# Patient Record
Sex: MALE | Race: WHITE | NOT HISPANIC OR LATINO | Employment: OTHER | URBAN - METROPOLITAN AREA
[De-identification: names, ages, dates, MRNs, and addresses within clinical notes are randomized per-mention and may not be internally consistent; named-entity substitution may affect disease eponyms.]

---

## 2017-01-26 DIAGNOSIS — K21.9 GASTROESOPHAGEAL REFLUX DISEASE, ESOPHAGITIS PRESENCE NOT SPECIFIED: ICD-10-CM

## 2017-01-26 NOTE — TELEPHONE ENCOUNTER
Last seen: 9/26/16 by Dr. Beltrán  Next appt: None    Was the patient seen in the last year in this department? Yes   Does patient have an active prescription for medications requested? No   Received Request Via: Pharmacy

## 2017-01-27 RX ORDER — FAMOTIDINE 20 MG/1
20 TABLET, FILM COATED ORAL 2 TIMES DAILY
Qty: 180 TAB | Refills: 1 | Status: SHIPPED | OUTPATIENT
Start: 2017-01-27 | End: 2017-03-15 | Stop reason: SDUPTHER

## 2017-02-20 DIAGNOSIS — E11.9 TYPE 2 DIABETES MELLITUS WITHOUT COMPLICATION, WITHOUT LONG-TERM CURRENT USE OF INSULIN (HCC): ICD-10-CM

## 2017-03-15 ENCOUNTER — OFFICE VISIT (OUTPATIENT)
Dept: INTERNAL MEDICINE | Facility: MEDICAL CENTER | Age: 82
End: 2017-03-15
Payer: MEDICARE

## 2017-03-15 VITALS
WEIGHT: 174 LBS | DIASTOLIC BLOOD PRESSURE: 64 MMHG | OXYGEN SATURATION: 96 % | HEIGHT: 70 IN | SYSTOLIC BLOOD PRESSURE: 120 MMHG | TEMPERATURE: 98.2 F | HEART RATE: 84 BPM | BODY MASS INDEX: 24.91 KG/M2

## 2017-03-15 DIAGNOSIS — K59.00 CONSTIPATION, UNSPECIFIED CONSTIPATION TYPE: ICD-10-CM

## 2017-03-15 DIAGNOSIS — K21.9 GASTROESOPHAGEAL REFLUX DISEASE, ESOPHAGITIS PRESENCE NOT SPECIFIED: ICD-10-CM

## 2017-03-15 DIAGNOSIS — J20.8 VIRAL BRONCHITIS: ICD-10-CM

## 2017-03-15 DIAGNOSIS — E11.9 TYPE 2 DIABETES MELLITUS WITHOUT COMPLICATION, WITHOUT LONG-TERM CURRENT USE OF INSULIN (HCC): ICD-10-CM

## 2017-03-15 DIAGNOSIS — I10 ESSENTIAL HYPERTENSION: ICD-10-CM

## 2017-03-15 DIAGNOSIS — E78.5 DYSLIPIDEMIA: ICD-10-CM

## 2017-03-15 DIAGNOSIS — I25.10 CORONARY ARTERY DISEASE INVOLVING NATIVE CORONARY ARTERY OF NATIVE HEART WITHOUT ANGINA PECTORIS: ICD-10-CM

## 2017-03-15 PROCEDURE — G8598 ASA/ANTIPLAT THER USED: HCPCS | Mod: GC | Performed by: INTERNAL MEDICINE

## 2017-03-15 PROCEDURE — G8420 CALC BMI NORM PARAMETERS: HCPCS | Mod: GC | Performed by: INTERNAL MEDICINE

## 2017-03-15 PROCEDURE — 4040F PNEUMOC VAC/ADMIN/RCVD: CPT | Mod: GC | Performed by: INTERNAL MEDICINE

## 2017-03-15 PROCEDURE — G8482 FLU IMMUNIZE ORDER/ADMIN: HCPCS | Mod: GC | Performed by: INTERNAL MEDICINE

## 2017-03-15 PROCEDURE — G8432 DEP SCR NOT DOC, RNG: HCPCS | Mod: GC | Performed by: INTERNAL MEDICINE

## 2017-03-15 PROCEDURE — 99213 OFFICE O/P EST LOW 20 MIN: CPT | Mod: GE | Performed by: INTERNAL MEDICINE

## 2017-03-15 PROCEDURE — 1101F PT FALLS ASSESS-DOCD LE1/YR: CPT | Mod: GC | Performed by: INTERNAL MEDICINE

## 2017-03-15 PROCEDURE — 1036F TOBACCO NON-USER: CPT | Mod: GC | Performed by: INTERNAL MEDICINE

## 2017-03-15 RX ORDER — FLUTICASONE PROPIONATE 50 MCG
1 SPRAY, SUSPENSION (ML) NASAL DAILY
Qty: 16 G | Refills: 0 | Status: SHIPPED | OUTPATIENT
Start: 2017-03-15

## 2017-03-15 RX ORDER — AMLODIPINE BESYLATE 10 MG/1
10 TABLET ORAL DAILY
Qty: 30 TAB | Refills: 5 | Status: SHIPPED | OUTPATIENT
Start: 2017-03-15

## 2017-03-15 RX ORDER — CODEINE PHOSPHATE/GUAIFENESIN 10-100MG/5
5 LIQUID (ML) ORAL 3 TIMES DAILY PRN
Qty: 120 ML | Refills: 0 | Status: SHIPPED | OUTPATIENT
Start: 2017-03-15

## 2017-03-15 RX ORDER — CLOPIDOGREL BISULFATE 75 MG/1
75 TABLET ORAL DAILY
Qty: 30 TAB | Refills: 5 | Status: SHIPPED | OUTPATIENT
Start: 2017-03-15

## 2017-03-15 RX ORDER — ATORVASTATIN CALCIUM 20 MG/1
20 TABLET, FILM COATED ORAL
Qty: 30 TAB | Refills: 5 | Status: SHIPPED | OUTPATIENT
Start: 2017-03-15

## 2017-03-15 RX ORDER — FAMOTIDINE 20 MG/1
20 TABLET, FILM COATED ORAL 2 TIMES DAILY
Qty: 60 TAB | Refills: 5 | Status: SHIPPED | OUTPATIENT
Start: 2017-03-15

## 2017-03-15 RX ORDER — METOPROLOL SUCCINATE 25 MG/1
25 TABLET, EXTENDED RELEASE ORAL DAILY
Qty: 30 TAB | Refills: 5 | Status: SHIPPED | OUTPATIENT
Start: 2017-03-15

## 2017-03-15 RX ORDER — EZETIMIBE 10 MG
10 TABLET ORAL DAILY
Qty: 30 TAB | Refills: 5 | Status: SHIPPED | OUTPATIENT
Start: 2017-03-15

## 2017-03-15 RX ORDER — BENAZEPRIL HYDROCHLORIDE 40 MG/1
40 TABLET ORAL DAILY
Qty: 30 TAB | Refills: 5 | Status: SHIPPED | OUTPATIENT
Start: 2017-03-15

## 2017-03-15 RX ORDER — SENNOSIDES 8.6 MG
1 TABLET ORAL
Qty: 30 TAB | Refills: 5 | Status: SHIPPED | OUTPATIENT
Start: 2017-03-15

## 2017-03-15 RX ORDER — HYDROCHLOROTHIAZIDE 12.5 MG/1
12.5 CAPSULE, GELATIN COATED ORAL DAILY
Qty: 30 CAP | Refills: 5 | Status: SHIPPED | OUTPATIENT
Start: 2017-03-15

## 2017-03-15 NOTE — MR AVS SNAPSHOT
"        Chantelle Fergusonalejandrina   3/15/2017 11:00 AM   Office Visit   MRN: [id number]    Department:  [facility]   Dept Phone:  [phone]    Description:  Male : 11/3/1932   Provider:  Kb Vazquez M.D.           Reason for Visit     Follow-Up follow up    Medication Problem cough medicine, see if he can get some      Allergies as of 3/15/2017     Not on File      You were diagnosed with     Viral bronchitis   [762068]       Type 2 diabetes mellitus without complication, without long-term current use of insulin (CMS-HCC)   [2182474]       Gastroesophageal reflux disease, esophagitis presence not specified   [2212406]       Dyslipidemia   [985082]       Essential hypertension   [3906823]       Constipation, unspecified constipation type   [2801952]       Coronary artery disease involving native coronary artery of native heart without angina pectoris   [5840373]         Vital Signs     Blood Pressure Pulse Temperature Height Weight Body Mass Index    120/64 mmHg 84 36.8 °C (98.2 °F) 1.778 m (5' 10\") 78.926 kg (174 lb) 24.97 kg/m2    Oxygen Saturation Smoking Status                96% Former Smoker          Basic Information     Date Of Birth Sex Race Ethnicity Preferred Language    11/3/1932 Male White Non- English      Problem List              ICD-10-CM Priority Class Noted - Resolved    Type 2 diabetes mellitus without complication (CMS-HCC) E11.9   2016 - Present    Essential hypertension I10   2016 - Present    Dyslipidemia E78.5   2016 - Present    Esophageal reflux K21.9   2016 - Present    Coronary artery disease involving native coronary artery of native heart without angina pectoris I25.10   2016 - Present    IGTN (ingrowing toe nail) L60.0   2016 - Present      Health Maintenance        Date Due Completion Dates    DIABETES MONOFILAMENT / LE EXAM 5/3/1933 ---    RETINAL SCREENING 11/3/1950 ---    IMM DTaP/Tdap/Td Vaccine (1 - Tdap) 11/3/1951 ---    IMM ZOSTER " VACCINE 11/3/1992 ---    A1C SCREENING 7/5/2016 1/5/2016, 5/22/2014, 1/2/2014, 1/15/2013, 7/9/2012, 3/2/2012, 9/9/2011, 9/1/2010, 1/27/2010    URINE ACR / MICROALBUMIN 1/5/2017 1/5/2016, 7/9/2012, 6/13/2011, 9/1/2010    SERUM CREATININE 1/5/2017 1/5/2016, 4/2/2015, 5/22/2014, 1/2/2014, 1/15/2013, 7/9/2012, 12/16/2011, 9/9/2011, 6/13/2011, 4/7/2011, 1/10/2011, 9/1/2010, 1/27/2010, 5/13/2009    FASTING LIPID PROFILE 9/6/2017 9/6/2016, 1/5/2016, 5/28/2015, 4/2/2015, 5/22/2014, 1/2/2014, 1/15/2013, 7/9/2012, 3/2/2012, 12/16/2011, 9/9/2011, 6/13/2011, 9/1/2010, 1/27/2010    COLONOSCOPY 3/10/2027 3/10/2017 (N/S)    Override on 3/10/2017: (N/S) (PER [facility] NO COLONOSCOPY)            Current Immunizations     13-VALENT PCV PREVNAR 1/21/2016    Influenza Vaccine Adult HD 11/17/2016    Pneumococcal polysaccharide vaccine (PPSV-23) 12/12/2006      Below and/or attached are the medications your provider expects you to take. Review all of your home medications and newly ordered medications with your provider and/or pharmacist. Follow medication instructions as directed by your provider and/or pharmacist. Please keep your medication list with you and share with your provider. Update the information when medications are discontinued, doses are changed, or new medications (including over-the-counter products) are added; and carry medication information at all times in the event of emergency situations     Allergies:  No Known Allergies          Medications  Valid as of: March 15, 2017 - 11:41 AM    Generic Name Brand Name Tablet Size Instructions for use    AmLODIPine Besylate (Tab) NORVASC 10 MG Take 1 Tab by mouth every day.        Aspirin (Tablet Delayed Response) ECOTRIN 81 MG Take 1 Tab by mouth every day.        Atorvastatin Calcium (Tab) LIPITOR 20 MG Take 1 Tab by mouth every bedtime.        Benazepril HCl (Tab) LOTENSIN 40 MG Take 1 Tab by mouth every day.        Calcium-Vitamin D   Take  by mouth.        Clopidogrel  Bisulfate (Tab) PLAVIX 75 MG Take 1 Tab by mouth every day.        Ezetimibe (Tab) ZETIA 10 MG Take 1 Tab by mouth every day.        Famotidine (Tab) PEPCID 20 MG Take 1 Tab by mouth 2 times a day.        Fluticasone Propionate (Suspension) FLONASE 50 MCG/ACT Spray 1 Spray in nose every day.        Guaifenesin-Codeine (Syrup) TUSSI-ORGANIDIN -10 MG/5ML Take 5 mL by mouth 3 times a day as needed for Cough.        HydroCHLOROthiazide (Cap) MICROZIDE 12.5 MG Take 1 Cap by mouth every day.        MetFORMIN HCl (Tab) GLUCOPHAGE 500 MG Take 1 Tab by mouth 2 times a day, with meals.        Metoprolol Succinate (TABLET SR 24 HR) TOPROL XL 25 MG Take 1 Tab by mouth every day.        Multiple Vitamins-Minerals   Take  by mouth.        Omega-3 Fatty Acids   Take  by mouth.        Sennosides (Tab) Senna 8.6 MG Take 1 Tab by mouth every bedtime.        .                 Medicines prescribed today were sent to:     [facility] [address]    Phone: [phone] Fax: [phone]    Open 24 Hours?: No      Medication refill instructions:       If your prescription bottle indicates you have medication refills left, it is not necessary to call your provider’s office. Please contact your pharmacy and they will refill your medication.    If your prescription bottle indicates you do not have any refills left, you may request refills at any time through one of the following ways: The online [employer] system (except Urgent Care), by calling your provider’s office, or by asking your pharmacy to contact your provider’s office with a refill request. Medication refills are processed only during regular business hours and may not be available until the next business day. Your provider may request additional information or to have a follow-up visit with you prior to refilling your medication.   *Please Note: Medication refills are assigned a new Rx number when refilled electronically.  Your pharmacy may indicate that no refills were authorized even though a new prescription for the same medication is available at the pharmacy. Please request the medicine by name with the pharmacy before contacting your provider for a refill.        Your To Do List     Future Labs/Procedures Complete By Expires    CBC WITH DIFFERENTIAL  As directed 3/15/2018    COMP METABOLIC PANEL  As directed 3/15/2018    FOLATE  As directed 3/15/2018    HEMOGLOBIN A1C  As directed 3/15/2018    LIPID PROFILE  As directed 3/15/2018    MICROALBUMIN CREAT RATIO URINE  As directed 3/15/2018    TSH WITH REFLEX TO FT4  As directed 3/15/2018    VITAMIN B12  As directed 3/15/2018         MyChart Access Code: Activation code not generated  Current [employer] Status: Active

## 2017-03-15 NOTE — PROGRESS NOTES
Established Patient    Chantelle presents today with the following:    CC:   Chief Complaint   Patient presents with   • Follow-Up     follow up   • Medication Problem     cough medicine, see if he can get some     HPI:   Mr. Stout is a 83 y/o M with pMHx significant for DM-2, HLD, HTN, osteoporosis, hx of L common fem art stenosis presents to the clinic for a routine follow up appointment.    Acute Issues:   1. Viral bronchitis   Patient reports having symptoms of upper respiratory tract infection described primarily as nasal congestion and dry cough for the last 7-10 days. He reports several people at his work who have recently recovered from similar upper respiratory tract infections and he believes he also caught the same bug, despite getting the flu vaccine this year. He denies any shortness of breath, chest pain, productive cough or sore throat. His primary complaint is bursal dry cough at night to prevent her from sleeping. He otherwise denies dyspnea on exertion, increasing weight or lower extremity edema, orthopnea or PND.    Chronic Issues:   1. DM   Continues to take metformin 500 mg twice a day. Denies any polyuria or polydipsia. He has not had blood work in some time but his last A1c about one year ago was 7.1.    2. HTN  The patient's blood pressure in the office today was well within normal limits at 120/60. He denies any symptoms of hypo-or hypertension at home. He occasionally checks his blood pressure at home and reports numbers between 120-150, and rarely ever above 150. He is currently taking hydrochlorothiazide, amlodipine and benazepril. Denies any adverse effects associated with his medications.     3. DLD   He is on a statin and Zetia and tolerating both of them very well.    4. CAD  He has a history of 3 stents to prox and mid RCA (2.25 - 12 mm x 2, 2.25 x 14mm x 1, 10/2014,Dr. Li, [facility]). He denies any chest pain, dyspnea on exertion, shortness of breath, orthopnea or PND. He  has a follow-up with Dr. Li later this week.     5. Hx of PV  He underwent peripheral angioplasty and is currently taking aspirin and Plavix and statin. He reports some discomfort in his legs at times with ambulation but denies persistent claudication-like pain.     Health Maintenance:    Discussed zoster vaccine but patient not interested.  Received vaccinations for both pneumonia vaccines in the past few years.    Patient Active Problem List    Diagnosis Date Noted   • IGTN (ingrowing toe nail) 09/26/2016   • Type 2 diabetes mellitus without complication (CMS-[facility]) 05/11/2016   • Essential hypertension 05/11/2016   • Dyslipidemia 05/11/2016   • Esophageal reflux 05/11/2016   • Coronary artery disease involving native coronary artery of native heart without angina pectoris 05/11/2016       Current Outpatient Prescriptions   Medication Sig Dispense Refill   • guaifenesin-codeine (TUSSI-ORGANIDIN NR) 100-10 MG/5ML syrup Take 5 mL by mouth 3 times a day as needed for Cough. 120 mL 0   • fluticasone (FLONASE) 50 MCG/ACT nasal spray Spray 1 Spray in nose every day. 16 g 0   • ZETIA 10 MG Tab Take 1 Tab by mouth every day. 30 Tab 5   • Sennosides (SENNA) 8.6 MG Tab Take 1 Tab by mouth every bedtime. 30 Tab 5   • metoprolol SR (TOPROL XL) 25 MG TABLET SR 24 HR Take 1 Tab by mouth every day. 30 Tab 5   • metformin (GLUCOPHAGE) 500 MG Tab Take 1 Tab by mouth 2 times a day, with meals. 60 Tab 5   • hydrochlorothiazide (MICROZIDE) 12.5 MG capsule Take 1 Cap by mouth every day. 30 Cap 5   • famotidine (PEPCID) 20 MG Tab Take 1 Tab by mouth 2 times a day. 60 Tab 5   • clopidogrel (PLAVIX) 75 MG Tab Take 1 Tab by mouth every day. 30 Tab 5   • benazepril (LOTENSIN) 40 MG tablet Take 1 Tab by mouth every day. 30 Tab 5   • atorvastatin (LIPITOR) 20 MG Tab Take 1 Tab by mouth every bedtime. 30 Tab 5   • aspirin EC (ECOTRIN) 81 MG Tablet Delayed Response Take 1 Tab by mouth every day. 30 Tab 5   • amlodipine (NORVASC) 10 MG Tab  "Take 1 Tab by mouth every day. 30 Tab 5   • Calcium-Vitamin D (CALTRATE 600 PLUS-VIT D PO) Take  by mouth.     • Multiple Vitamins-Minerals (CENTRUM SILVER PO) Take  by mouth.     • Omega-3 Fatty Acids (FISH OIL PO) Take  by mouth.       No current facility-administered medications for this visit.       Allergies:Not on File    ROS  Constitutional: Denies fevers or chills  Ears/Nose/Throat/Mouth: Reports nasal congestion denies sore throat   Cardiovascular: Denies chest pain or palpitations   Respiratory: Denies shortness of breath, reports dry cough  Gastrointestinal/Hepatic: Denies abd pain, nausea, vomiting   Musculoskeletal/Rheum: Steady ambulation with four-wheel walker.  Neurological: Denies headache  Psychiatric: Denies mood disorder   Endocrine: History of diabetes    /64 mmHg  Pulse 84  Temp(Src) 36.8 °C (98.2 °F)  Ht 1.778 m (5' 10\")  Wt 78.926 kg (174 lb)  BMI 24.97 kg/m2  SpO2 96%    Physical Exam  General: non-toxic apeparnce. NAD.   HEENT: EOMI. Scleral clear.  CVS: normal S1, S2. NSR. No m/r/g. No JVD.   PULM: CTABL . No w/r/r. No basilar crackles.   ABD: soft, NT, ND. +BS.   EXT: no LE edema b/l. No cyanosis.  Neuro: No focal deficits.   Pysch: AAOx4  Skin: no rashes.     Assessment and Plan  1. Viral bronchitis  The patient's recent symptoms suggestive of viral bronchitis. There is little or no suspicion for superimposed bacterial infection; therefore was no indication for the use of antibiotics. He has tried over-the-counter remedies with no success in controlling his cough. He reports use of codeine cough syrup in the past with marked improvement in symptoms. He has no red flag behavior with regards to narcotics. Provided prescription for codeine cough syrup for PRN use. We will try Flonase one spray in each nostril daily for better control of his nasal congestion.   - guaifenesin-codeine (TUSSI-ORGANIDIN NR) 100-10 MG/5ML syrup; Take 5 mL by mouth 3 times a day as needed for Cough.  " Dispense: 120 mL; Refill: 0  - fluticasone (FLONASE) 50 MCG/ACT nasal spray; Spray 1 Spray in nose every day.  Dispense: 16 g; Refill: 0  - CBC WITH DIFFERENTIAL; Future  - TSH WITH REFLEX TO FT4; Future    2. Type 2 diabetes mellitus without complication, without long-term current use of insulin (CMS-HCC)  Patient is been very compliant with his diabetic medications and has not had an A1c in some time. His last A1c was 7.1.  Repeat labs as listed below. Will call patient if medications need to be adjusted based on lab findings.  Encouraged the patient to examine the feet every day and see the optometrist annually.  - metformin (GLUCOPHAGE) 500 MG Tab; Take 1 Tab by mouth 2 times a day, with meals.  Dispense: 60 Tab; Refill: 5  - CBC WITH DIFFERENTIAL; Future  - COMP METABOLIC PANEL; Future  - HEMOGLOBIN A1C; Future  - LIPID PROFILE; Future  - MICROALBUMIN CREAT RATIO URINE; Future  - VITAMIN B12; Future  - FOLATE; Future  - TSH WITH REFLEX TO FT4; Future    3. Gastroesophageal reflux disease, esophagitis presence not specified  Patient reports well-controlled GERD with twice a day Pepcid. Discussed tapering off and stopping Pepcid but the patient wishes to continue taking it.  - famotidine (PEPCID) 20 MG Tab; Take 1 Tab by mouth 2 times a day.  Dispense: 60 Tab; Refill: 5  - CBC WITH DIFFERENTIAL; Future  - TSH WITH REFLEX TO FT4; Future    4. Dyslipidemia  Previous lipid panels reviewed showing optimal cholesterol control. Continue current medications without dosages changes.  - ZETIA 10 MG Tab; Take 1 Tab by mouth every day.  Dispense: 30 Tab; Refill: 5  - atorvastatin (LIPITOR) 20 MG Tab; Take 1 Tab by mouth every bedtime.  Dispense: 30 Tab; Refill: 5  - CBC WITH DIFFERENTIAL; Future  - HEMOGLOBIN A1C; Future  - LIPID PROFILE; Future  - TSH WITH REFLEX TO FT4; Future    5. Essential hypertension  Blood pressure well within normal limits in the office and acceptable range even at home.   Educated the patient  about symptoms of hypotension (dizziness, lightheadedness, presyncope or syncope) that warrant medical attention as any increased fall risk.  Patient is tolerating his medications well and denies any symptoms of hypotension. Continue his current medications without dosages changes.  - hydrochlorothiazide (MICROZIDE) 12.5 MG capsule; Take 1 Cap by mouth every day.  Dispense: 30 Cap; Refill: 5  - benazepril (LOTENSIN) 40 MG tablet; Take 1 Tab by mouth every day.  Dispense: 30 Tab; Refill: 5  - amlodipine (NORVASC) 10 MG Tab; Take 1 Tab by mouth every day.  Dispense: 30 Tab; Refill: 5  - CBC WITH DIFFERENTIAL; Future  - COMP METABOLIC PANEL; Future  - VITAMIN B12; Future  - TSH WITH REFLEX TO FT4; Future    6. Constipation, unspecified constipation type  Patient reports good bowel habits and uses his constipation medication about once a week.  - Sennosides (SENNA) 8.6 MG Tab; Take 1 Tab by mouth every bedtime.  Dispense: 30 Tab; Refill: 5  - CBC WITH DIFFERENTIAL; Future  - FOLATE; Future  - TSH WITH REFLEX TO FT4; Future    7. Coronary artery disease involving native coronary artery of native heart without angina pectoris  The patient had 3 stents to prox and mid RCA (2.25 - 12 mm x 2, 2.25 x 14mm x 1, 10/2014,Dr. Li, [facility]).   He denies any chest pain, dyspnea on exertion, shortness of breath, orthopnea or PND. He has a follow-up with Dr. Li later this week.  Continue aspirin, Plavix, beta blocker, ACE inhibitor, statin and Zetia.     5. Hx of PV  He underwent peripheral angioplasty and is currently taking aspirin, Plavix and statin.   - ZETIA 10 MG Tab; Take 1 Tab by mouth every day.  Dispense: 30 Tab; Refill: 5  - metoprolol SR (TOPROL XL) 25 MG TABLET SR 24 HR; Take 1 Tab by mouth every day.  Dispense: 30 Tab; Refill: 5  - clopidogrel (PLAVIX) 75 MG Tab; Take 1 Tab by mouth every day.  Dispense: 30 Tab; Refill: 5  - atorvastatin (LIPITOR) 20 MG Tab; Take 1 Tab by mouth every bedtime.  Dispense: 30  Tab; Refill: 5  - aspirin EC (ECOTRIN) 81 MG Tablet Delayed Response; Take 1 Tab by mouth every day.  Dispense: 30 Tab; Refill: 5  - CBC WITH DIFFERENTIAL; Future  - COMP METABOLIC PANEL; Future  - VITAMIN B12; Future  - FOLATE; Future  - TSH WITH REFLEX TO FT4; Future    The patient will have his routine labs completed in the next week or so. We'll call the patient with updates otherwise he will follow-up in 6 months.    Follow-up:Return in about 6 months (around 9/15/2017), or if symptoms worsen or fail to improve.    Signed by: Kb Vazquez M.D.

## 2017-04-26 ENCOUNTER — HOSPITAL ENCOUNTER (INPATIENT)
Dept: HOSPITAL 8 - CCU | Age: 82
LOS: 8 days | Discharge: NURSING, REHAB OR LONG-TERM CARE | DRG: 246 | End: 2017-05-04
Attending: INTERNAL MEDICINE | Admitting: INTERNAL MEDICINE
Payer: MEDICARE

## 2017-04-26 VITALS — WEIGHT: 191.36 LBS | HEIGHT: 70 IN | BODY MASS INDEX: 27.4 KG/M2

## 2017-04-26 DIAGNOSIS — Z87.891: ICD-10-CM

## 2017-04-26 DIAGNOSIS — I46.9: ICD-10-CM

## 2017-04-26 DIAGNOSIS — I49.01: ICD-10-CM

## 2017-04-26 DIAGNOSIS — E87.0: ICD-10-CM

## 2017-04-26 DIAGNOSIS — Z79.899: ICD-10-CM

## 2017-04-26 DIAGNOSIS — I42.9: ICD-10-CM

## 2017-04-26 DIAGNOSIS — I50.41: ICD-10-CM

## 2017-04-26 DIAGNOSIS — J69.0: ICD-10-CM

## 2017-04-26 DIAGNOSIS — I48.91: ICD-10-CM

## 2017-04-26 DIAGNOSIS — Z99.11: ICD-10-CM

## 2017-04-26 DIAGNOSIS — I11.0: ICD-10-CM

## 2017-04-26 DIAGNOSIS — Z91.19: ICD-10-CM

## 2017-04-26 DIAGNOSIS — E87.6: ICD-10-CM

## 2017-04-26 DIAGNOSIS — Z79.82: ICD-10-CM

## 2017-04-26 DIAGNOSIS — E78.5: ICD-10-CM

## 2017-04-26 DIAGNOSIS — I25.2: ICD-10-CM

## 2017-04-26 DIAGNOSIS — C34.12: ICD-10-CM

## 2017-04-26 DIAGNOSIS — J96.00: ICD-10-CM

## 2017-04-26 DIAGNOSIS — Z86.73: ICD-10-CM

## 2017-04-26 DIAGNOSIS — E11.51: ICD-10-CM

## 2017-04-26 DIAGNOSIS — I25.10: ICD-10-CM

## 2017-04-26 DIAGNOSIS — I21.4: Primary | ICD-10-CM

## 2017-04-26 DIAGNOSIS — E43: ICD-10-CM

## 2017-04-26 DIAGNOSIS — Z86.79: ICD-10-CM

## 2017-04-26 DIAGNOSIS — I47.2: ICD-10-CM

## 2017-04-26 DIAGNOSIS — Z79.02: ICD-10-CM

## 2017-04-26 DIAGNOSIS — D64.9: ICD-10-CM

## 2017-04-26 LAB
ABG COLLECTION SITE: (no result)
BUN SERPL-MCNC: 45 MG/DL (ref 7–18)
COLLATERAL CIRCULATION TESTING: NORMAL
O2/TOTAL GAS SETTING VFR VENT: 80 %

## 2017-04-26 PROCEDURE — P9047 ALBUMIN (HUMAN), 25%, 50ML: HCPCS

## 2017-04-26 PROCEDURE — 94640 AIRWAY INHALATION TREATMENT: CPT

## 2017-04-26 PROCEDURE — 75716 ARTERY X-RAYS ARMS/LEGS: CPT

## 2017-04-26 PROCEDURE — 88305 TISSUE EXAM BY PATHOLOGIST: CPT

## 2017-04-26 PROCEDURE — 87070 CULTURE OTHR SPECIMN AEROBIC: CPT

## 2017-04-26 PROCEDURE — 82803 BLOOD GASES ANY COMBINATION: CPT

## 2017-04-26 PROCEDURE — C1757 CATH, THROMBECTOMY/EMBOLECT: HCPCS

## 2017-04-26 PROCEDURE — 85300 ANTITHROMBIN III ACTIVITY: CPT

## 2017-04-26 PROCEDURE — 84484 ASSAY OF TROPONIN QUANT: CPT

## 2017-04-26 PROCEDURE — 83540 ASSAY OF IRON: CPT

## 2017-04-26 PROCEDURE — 80048 BASIC METABOLIC PNL TOTAL CA: CPT

## 2017-04-26 PROCEDURE — C1760 CLOSURE DEV, VASC: HCPCS

## 2017-04-26 PROCEDURE — 83735 ASSAY OF MAGNESIUM: CPT

## 2017-04-26 PROCEDURE — 83880 ASSAY OF NATRIURETIC PEPTIDE: CPT

## 2017-04-26 PROCEDURE — 32405: CPT

## 2017-04-26 PROCEDURE — P9016 RBC LEUKOCYTES REDUCED: HCPCS

## 2017-04-26 PROCEDURE — 87205 SMEAR GRAM STAIN: CPT

## 2017-04-26 PROCEDURE — 85301 ANTITHROMBIN III ANTIGEN: CPT

## 2017-04-26 PROCEDURE — C1887 CATHETER, GUIDING: HCPCS

## 2017-04-26 PROCEDURE — 85347 COAGULATION TIME ACTIVATED: CPT

## 2017-04-26 PROCEDURE — 5A12012 PERFORMANCE OF CARDIAC OUTPUT, SINGLE, MANUAL: ICD-10-PCS

## 2017-04-26 PROCEDURE — 86900 BLOOD TYPING SEROLOGIC ABO: CPT

## 2017-04-26 PROCEDURE — C1894 INTRO/SHEATH, NON-LASER: HCPCS

## 2017-04-26 PROCEDURE — 86850 RBC ANTIBODY SCREEN: CPT

## 2017-04-26 PROCEDURE — 84132 ASSAY OF SERUM POTASSIUM: CPT

## 2017-04-26 PROCEDURE — 84478 ASSAY OF TRIGLYCERIDES: CPT

## 2017-04-26 PROCEDURE — 74340 X-RAY GUIDE FOR GI TUBE: CPT

## 2017-04-26 PROCEDURE — 86923 COMPATIBILITY TEST ELECTRIC: CPT

## 2017-04-26 PROCEDURE — 36415 COLL VENOUS BLD VENIPUNCTURE: CPT

## 2017-04-26 PROCEDURE — C1769 GUIDE WIRE: HCPCS

## 2017-04-26 PROCEDURE — 80053 COMPREHEN METABOLIC PANEL: CPT

## 2017-04-26 PROCEDURE — 93454 CORONARY ARTERY ANGIO S&I: CPT

## 2017-04-26 PROCEDURE — 74230 X-RAY XM SWLNG FUNCJ C+: CPT

## 2017-04-26 PROCEDURE — 0BH17EZ INSERTION OF ENDOTRACHEAL AIRWAY INTO TRACHEA, VIA NATURAL OR ARTIFICIAL OPENING: ICD-10-PCS

## 2017-04-26 PROCEDURE — C9602 PERC D-E COR STENT ATHER S: HCPCS

## 2017-04-26 PROCEDURE — 94003 VENT MGMT INPAT SUBQ DAY: CPT

## 2017-04-26 PROCEDURE — C1725 CATH, TRANSLUMIN NON-LASER: HCPCS

## 2017-04-26 PROCEDURE — C1874 STENT, COATED/COV W/DEL SYS: HCPCS

## 2017-04-26 PROCEDURE — 71250 CT THORAX DX C-: CPT

## 2017-04-26 PROCEDURE — 83550 IRON BINDING TEST: CPT

## 2017-04-26 PROCEDURE — 85610 PROTHROMBIN TIME: CPT

## 2017-04-26 PROCEDURE — 5A1945Z RESPIRATORY VENTILATION, 24-96 CONSECUTIVE HOURS: ICD-10-PCS

## 2017-04-26 PROCEDURE — 94002 VENT MGMT INPAT INIT DAY: CPT

## 2017-04-26 PROCEDURE — 82962 GLUCOSE BLOOD TEST: CPT

## 2017-04-26 PROCEDURE — C1724 CATH, TRANS ATHEREC,ROTATION: HCPCS

## 2017-04-26 PROCEDURE — 93005 ELECTROCARDIOGRAM TRACING: CPT

## 2017-04-26 PROCEDURE — S0028 INJECTION, FAMOTIDINE, 20 MG: HCPCS

## 2017-04-26 PROCEDURE — 77012 CT SCAN FOR NEEDLE BIOPSY: CPT

## 2017-04-26 PROCEDURE — 33210 INSERT ELECTRD/PM CATH SNGL: CPT

## 2017-04-26 PROCEDURE — 71010: CPT

## 2017-04-26 PROCEDURE — 33967 INSERT I-AORT PERCUT DEVICE: CPT

## 2017-04-26 PROCEDURE — C9600 PERC DRUG-EL COR STENT SING: HCPCS

## 2017-04-26 PROCEDURE — 85025 COMPLETE CBC W/AUTO DIFF WBC: CPT

## 2017-04-26 PROCEDURE — 82728 ASSAY OF FERRITIN: CPT

## 2017-04-26 PROCEDURE — 87081 CULTURE SCREEN ONLY: CPT

## 2017-04-26 PROCEDURE — 84100 ASSAY OF PHOSPHORUS: CPT

## 2017-04-26 PROCEDURE — 36600 WITHDRAWAL OF ARTERIAL BLOOD: CPT

## 2017-04-26 PROCEDURE — 85520 HEPARIN ASSAY: CPT

## 2017-04-26 PROCEDURE — 75625 CONTRAST EXAM ABDOMINL AORTA: CPT

## 2017-04-26 RX ADMIN — POTASSIUM CHLORIDE SCH MEQ: 1.5 POWDER, FOR SOLUTION ORAL at 20:48

## 2017-04-26 RX ADMIN — FAMOTIDINE SCH MG: 10 INJECTION INTRAVENOUS at 20:49

## 2017-04-26 RX ADMIN — SODIUM CHLORIDE, PRESERVATIVE FREE SCH ML: 5 INJECTION INTRAVENOUS at 20:49

## 2017-04-26 RX ADMIN — EPTIFIBATIDE SCH MLS/HR: 0.75 INJECTION, SOLUTION INTRAVENOUS at 18:30

## 2017-04-26 RX ADMIN — POTASSIUM CHLORIDE SCH MEQ: 1.5 POWDER, FOR SOLUTION ORAL at 20:56

## 2017-04-26 RX ADMIN — AMIODARONE HYDROCHLORIDE PRN MLS/HR: 50 INJECTION, SOLUTION INTRAVENOUS at 18:00

## 2017-04-26 RX ADMIN — SODIUM CHLORIDE AND POTASSIUM CHLORIDE SCH MLS/HR: .9; .15 SOLUTION INTRAVENOUS at 16:28

## 2017-04-26 RX ADMIN — PROPOFOL PRN MLS/HR: 10 INJECTION, EMULSION INTRAVENOUS at 18:00

## 2017-04-26 RX ADMIN — FUROSEMIDE SCH MG: 10 INJECTION, SOLUTION INTRAMUSCULAR; INTRAVENOUS at 20:48

## 2017-04-26 RX ADMIN — METOPROLOL TARTRATE SCH MG: 25 TABLET, FILM COATED ORAL at 18:00

## 2017-04-26 RX ADMIN — AMPICILLIN SODIUM AND SULBACTAM SODIUM SCH MLS/HR: 2; 1 INJECTION, POWDER, FOR SOLUTION INTRAMUSCULAR; INTRAVENOUS at 20:48

## 2017-04-26 RX ADMIN — INSULIN HUMAN SCH UNITS: 100 INJECTION, SOLUTION PARENTERAL at 20:55

## 2017-04-27 VITALS — SYSTOLIC BLOOD PRESSURE: 112 MMHG | DIASTOLIC BLOOD PRESSURE: 54 MMHG

## 2017-04-27 LAB
ABG COLLECTION SITE: (no result)
ANISOCYTOSIS BLD QL SMEAR: (no result)
AST SERPL-CCNC: 32 U/L (ref 15–37)
BUN SERPL-MCNC: 41 MG/DL (ref 7–18)
COLLATERAL CIRCULATION TESTING: NORMAL
DIFF TOTAL CELLS COUNTED: (no result)
GIANT PLATELETS BLD QL SMEAR: (no result)
IS PT STATUS REG ER OR PRE ER?: NO
IS PT STATUS REG ER OR PRE ER?: NO
O2/TOTAL GAS SETTING VFR VENT: 50 %
POLYCHROMASIA BLD QL SMEAR: (no result)
TIBC SERPL-MCNC: 131 MCG/DL (ref 250–450)
VERIFY COUNTS?: YES

## 2017-04-27 PROCEDURE — 02C13ZZ EXTIRPATION OF MATTER FROM CORONARY ARTERY, TWO ARTERIES, PERCUTANEOUS APPROACH: ICD-10-PCS

## 2017-04-27 PROCEDURE — 4A023N7 MEASUREMENT OF CARDIAC SAMPLING AND PRESSURE, LEFT HEART, PERCUTANEOUS APPROACH: ICD-10-PCS

## 2017-04-27 PROCEDURE — 5A1223Z PERFORMANCE OF CARDIAC PACING, CONTINUOUS: ICD-10-PCS

## 2017-04-27 PROCEDURE — B41G1ZZ FLUOROSCOPY OF LEFT LOWER EXTREMITY ARTERIES USING LOW OSMOLAR CONTRAST: ICD-10-PCS

## 2017-04-27 PROCEDURE — B41F1ZZ FLUOROSCOPY OF RIGHT LOWER EXTREMITY ARTERIES USING LOW OSMOLAR CONTRAST: ICD-10-PCS

## 2017-04-27 PROCEDURE — B2111ZZ FLUOROSCOPY OF MULTIPLE CORONARY ARTERIES USING LOW OSMOLAR CONTRAST: ICD-10-PCS

## 2017-04-27 PROCEDURE — 5A2204Z RESTORATION OF CARDIAC RHYTHM, SINGLE: ICD-10-PCS

## 2017-04-27 PROCEDURE — 027237Z DILATION OF CORONARY ARTERY, THREE ARTERIES WITH FOUR OR MORE DRUG-ELUTING INTRALUMINAL DEVICES, PERCUTANEOUS APPROACH: ICD-10-PCS

## 2017-04-27 RX ADMIN — FAMOTIDINE SCH MG: 10 INJECTION INTRAVENOUS at 07:57

## 2017-04-27 RX ADMIN — AMPICILLIN SODIUM AND SULBACTAM SODIUM SCH MLS/HR: 2; 1 INJECTION, POWDER, FOR SOLUTION INTRAMUSCULAR; INTRAVENOUS at 20:20

## 2017-04-27 RX ADMIN — INSULIN HUMAN SCH UNITS: 100 INJECTION, SOLUTION PARENTERAL at 16:00

## 2017-04-27 RX ADMIN — INSULIN HUMAN SCH UNITS: 100 INJECTION, SOLUTION PARENTERAL at 20:23

## 2017-04-27 RX ADMIN — DOCUSATE SODIUM 50MG AND SENNOSIDES 8.6MG SCH TAB: 8.6; 5 TABLET, FILM COATED ORAL at 07:52

## 2017-04-27 RX ADMIN — FUROSEMIDE SCH MG: 10 INJECTION, SOLUTION INTRAMUSCULAR; INTRAVENOUS at 07:52

## 2017-04-27 RX ADMIN — METOPROLOL TARTRATE SCH MG: 25 TABLET, FILM COATED ORAL at 07:52

## 2017-04-27 RX ADMIN — TICAGRELOR SCH MG: 90 TABLET ORAL at 20:22

## 2017-04-27 RX ADMIN — SODIUM CHLORIDE, PRESERVATIVE FREE SCH ML: 5 INJECTION INTRAVENOUS at 07:53

## 2017-04-27 RX ADMIN — POTASSIUM CHLORIDE SCH MEQ: 1.5 POWDER, FOR SOLUTION ORAL at 20:22

## 2017-04-27 RX ADMIN — PROPOFOL PRN MLS/HR: 10 INJECTION, EMULSION INTRAVENOUS at 22:29

## 2017-04-27 RX ADMIN — POTASSIUM CHLORIDE SCH MEQ: 1.5 POWDER, FOR SOLUTION ORAL at 09:00

## 2017-04-27 RX ADMIN — SODIUM CHLORIDE, PRESERVATIVE FREE SCH ML: 5 INJECTION INTRAVENOUS at 20:22

## 2017-04-27 RX ADMIN — EPTIFIBATIDE SCH MLS/HR: 0.75 INJECTION, SOLUTION INTRAVENOUS at 02:01

## 2017-04-27 RX ADMIN — SODIUM CHLORIDE AND POTASSIUM CHLORIDE SCH MLS/HR: .9; .15 SOLUTION INTRAVENOUS at 16:32

## 2017-04-27 RX ADMIN — INSULIN HUMAN SCH UNITS: 100 INJECTION, SOLUTION PARENTERAL at 07:52

## 2017-04-27 RX ADMIN — AMPICILLIN SODIUM AND SULBACTAM SODIUM SCH MLS/HR: 2; 1 INJECTION, POWDER, FOR SOLUTION INTRAMUSCULAR; INTRAVENOUS at 02:51

## 2017-04-27 RX ADMIN — FAMOTIDINE SCH MG: 10 INJECTION INTRAVENOUS at 20:21

## 2017-04-27 RX ADMIN — FUROSEMIDE SCH MG: 10 INJECTION, SOLUTION INTRAMUSCULAR; INTRAVENOUS at 17:00

## 2017-04-27 RX ADMIN — AMPICILLIN SODIUM AND SULBACTAM SODIUM SCH MLS/HR: 2; 1 INJECTION, POWDER, FOR SOLUTION INTRAMUSCULAR; INTRAVENOUS at 07:51

## 2017-04-27 RX ADMIN — METOPROLOL TARTRATE SCH MG: 25 TABLET, FILM COATED ORAL at 18:00

## 2017-04-27 RX ADMIN — INSULIN HUMAN SCH UNITS: 100 INJECTION, SOLUTION PARENTERAL at 11:00

## 2017-04-27 RX ADMIN — CIPROFLOXACIN HYDROCHLORIDE SCH DROP: 3 SOLUTION/ DROPS OPHTHALMIC at 22:34

## 2017-04-27 RX ADMIN — AMIODARONE HYDROCHLORIDE PRN MLS/HR: 50 INJECTION, SOLUTION INTRAVENOUS at 02:02

## 2017-04-27 RX ADMIN — AMPICILLIN SODIUM AND SULBACTAM SODIUM SCH MLS/HR: 2; 1 INJECTION, POWDER, FOR SOLUTION INTRAMUSCULAR; INTRAVENOUS at 14:28

## 2017-04-27 RX ADMIN — LISINOPRIL SCH MG: 5 TABLET ORAL at 07:52

## 2017-04-28 VITALS — SYSTOLIC BLOOD PRESSURE: 138 MMHG | DIASTOLIC BLOOD PRESSURE: 65 MMHG

## 2017-04-28 LAB
ABG COLLECTION SITE: (no result)
BUN SERPL-MCNC: 38 MG/DL (ref 7–18)
O2/TOTAL GAS SETTING VFR VENT: 50 %

## 2017-04-28 RX ADMIN — POTASSIUM CHLORIDE SCH MEQ: 1.5 POWDER, FOR SOLUTION ORAL at 21:00

## 2017-04-28 RX ADMIN — METOPROLOL TARTRATE SCH MG: 25 TABLET, FILM COATED ORAL at 18:00

## 2017-04-28 RX ADMIN — INSULIN HUMAN SCH UNITS: 100 INJECTION, SOLUTION PARENTERAL at 11:00

## 2017-04-28 RX ADMIN — CIPROFLOXACIN HYDROCHLORIDE SCH DROP: 3 SOLUTION/ DROPS OPHTHALMIC at 21:05

## 2017-04-28 RX ADMIN — ASPIRIN 81 MG SCH MG: 81 TABLET ORAL at 10:28

## 2017-04-28 RX ADMIN — AMPICILLIN SODIUM AND SULBACTAM SODIUM SCH MLS/HR: 2; 1 INJECTION, POWDER, FOR SOLUTION INTRAMUSCULAR; INTRAVENOUS at 10:22

## 2017-04-28 RX ADMIN — TICAGRELOR SCH MG: 90 TABLET ORAL at 10:22

## 2017-04-28 RX ADMIN — FUROSEMIDE SCH MG: 10 INJECTION, SOLUTION INTRAMUSCULAR; INTRAVENOUS at 10:23

## 2017-04-28 RX ADMIN — FUROSEMIDE SCH MG: 10 INJECTION, SOLUTION INTRAMUSCULAR; INTRAVENOUS at 22:23

## 2017-04-28 RX ADMIN — FAMOTIDINE SCH MG: 10 INJECTION INTRAVENOUS at 10:22

## 2017-04-28 RX ADMIN — TICAGRELOR SCH MG: 90 TABLET ORAL at 21:00

## 2017-04-28 RX ADMIN — METOPROLOL TARTRATE SCH MG: 25 TABLET, FILM COATED ORAL at 06:22

## 2017-04-28 RX ADMIN — CIPROFLOXACIN HYDROCHLORIDE SCH DROP: 3 SOLUTION/ DROPS OPHTHALMIC at 10:23

## 2017-04-28 RX ADMIN — AMPICILLIN SODIUM AND SULBACTAM SODIUM SCH MLS/HR: 2; 1 INJECTION, POWDER, FOR SOLUTION INTRAMUSCULAR; INTRAVENOUS at 02:15

## 2017-04-28 RX ADMIN — SODIUM CHLORIDE, PRESERVATIVE FREE SCH ML: 5 INJECTION INTRAVENOUS at 21:05

## 2017-04-28 RX ADMIN — AMPICILLIN SODIUM AND SULBACTAM SODIUM SCH MLS/HR: 2; 1 INJECTION, POWDER, FOR SOLUTION INTRAMUSCULAR; INTRAVENOUS at 16:47

## 2017-04-28 RX ADMIN — LISINOPRIL SCH MG: 5 TABLET ORAL at 10:24

## 2017-04-28 RX ADMIN — POTASSIUM CHLORIDE SCH MEQ: 1.5 POWDER, FOR SOLUTION ORAL at 10:22

## 2017-04-28 RX ADMIN — FAMOTIDINE SCH MG: 10 INJECTION INTRAVENOUS at 21:04

## 2017-04-28 RX ADMIN — AMIODARONE HYDROCHLORIDE PRN MLS/HR: 50 INJECTION, SOLUTION INTRAVENOUS at 02:15

## 2017-04-28 RX ADMIN — POTASSIUM CHLORIDE SCH MEQ: 1.5 POWDER, FOR SOLUTION ORAL at 16:00

## 2017-04-28 RX ADMIN — INSULIN HUMAN SCH UNITS: 100 INJECTION, SOLUTION PARENTERAL at 16:00

## 2017-04-28 RX ADMIN — DOCUSATE SODIUM 50MG AND SENNOSIDES 8.6MG SCH TAB: 8.6; 5 TABLET, FILM COATED ORAL at 10:22

## 2017-04-28 RX ADMIN — SODIUM CHLORIDE, PRESERVATIVE FREE SCH ML: 5 INJECTION INTRAVENOUS at 10:25

## 2017-04-28 RX ADMIN — ALBUMIN (HUMAN) SCH MLS/HR: 25 SOLUTION INTRAVENOUS at 10:19

## 2017-04-28 RX ADMIN — ALBUMIN (HUMAN) SCH MLS/HR: 25 SOLUTION INTRAVENOUS at 21:04

## 2017-04-28 RX ADMIN — FUROSEMIDE SCH MG: 10 INJECTION, SOLUTION INTRAMUSCULAR; INTRAVENOUS at 07:30

## 2017-04-28 RX ADMIN — PROPOFOL PRN MLS/HR: 10 INJECTION, EMULSION INTRAVENOUS at 03:55

## 2017-04-28 RX ADMIN — INSULIN HUMAN SCH UNITS: 100 INJECTION, SOLUTION PARENTERAL at 21:00

## 2017-04-28 RX ADMIN — AMPICILLIN SODIUM AND SULBACTAM SODIUM SCH MLS/HR: 2; 1 INJECTION, POWDER, FOR SOLUTION INTRAMUSCULAR; INTRAVENOUS at 19:48

## 2017-04-28 RX ADMIN — INSULIN HUMAN SCH UNITS: 100 INJECTION, SOLUTION PARENTERAL at 06:58

## 2017-04-28 RX ADMIN — MORPHINE SULFATE PRN MG: 4 INJECTION INTRAVENOUS at 07:35

## 2017-04-29 VITALS — SYSTOLIC BLOOD PRESSURE: 130 MMHG | DIASTOLIC BLOOD PRESSURE: 57 MMHG

## 2017-04-29 VITALS — SYSTOLIC BLOOD PRESSURE: 146 MMHG | DIASTOLIC BLOOD PRESSURE: 56 MMHG

## 2017-04-29 VITALS — DIASTOLIC BLOOD PRESSURE: 66 MMHG | SYSTOLIC BLOOD PRESSURE: 152 MMHG

## 2017-04-29 VITALS — DIASTOLIC BLOOD PRESSURE: 55 MMHG | SYSTOLIC BLOOD PRESSURE: 111 MMHG

## 2017-04-29 VITALS — SYSTOLIC BLOOD PRESSURE: 144 MMHG | DIASTOLIC BLOOD PRESSURE: 57 MMHG

## 2017-04-29 VITALS — DIASTOLIC BLOOD PRESSURE: 48 MMHG | SYSTOLIC BLOOD PRESSURE: 128 MMHG

## 2017-04-29 VITALS — SYSTOLIC BLOOD PRESSURE: 125 MMHG | DIASTOLIC BLOOD PRESSURE: 50 MMHG

## 2017-04-29 VITALS — SYSTOLIC BLOOD PRESSURE: 145 MMHG | DIASTOLIC BLOOD PRESSURE: 56 MMHG

## 2017-04-29 LAB
ABG COLLECTION SITE: (no result)
ANISOCYTOSIS BLD QL SMEAR: (no result)
BUN SERPL-MCNC: 29 MG/DL (ref 7–18)
BUN SERPL-MCNC: 33 MG/DL (ref 7–18)
COLLATERAL CIRCULATION TESTING: NORMAL
LG PLATELETS BLD QL SMEAR: (no result)
MICROCYTES BLD QL SMEAR: (no result)
OVALOCYTES BLD QL SMEAR: (no result)
POLYCHROMASIA BLD QL SMEAR: (no result)

## 2017-04-29 PROCEDURE — 30233N1 TRANSFUSION OF NONAUTOLOGOUS RED BLOOD CELLS INTO PERIPHERAL VEIN, PERCUTANEOUS APPROACH: ICD-10-PCS

## 2017-04-29 RX ADMIN — ATORVASTATIN CALCIUM SCH MG: 80 TABLET, FILM COATED ORAL at 14:03

## 2017-04-29 RX ADMIN — TICAGRELOR SCH MG: 90 TABLET ORAL at 08:59

## 2017-04-29 RX ADMIN — ASPIRIN 81 MG SCH MG: 81 TABLET ORAL at 12:46

## 2017-04-29 RX ADMIN — POTASSIUM CHLORIDE SCH MLS/HR: 2 INJECTION, SOLUTION, CONCENTRATE INTRAVENOUS at 09:39

## 2017-04-29 RX ADMIN — EPTIFIBATIDE PRN MLS/HR: 0.75 INJECTION, SOLUTION INTRAVENOUS at 00:28

## 2017-04-29 RX ADMIN — LISINOPRIL SCH MG: 5 TABLET ORAL at 08:59

## 2017-04-29 RX ADMIN — CIPROFLOXACIN HYDROCHLORIDE SCH DROP: 3 SOLUTION/ DROPS OPHTHALMIC at 21:08

## 2017-04-29 RX ADMIN — TICAGRELOR SCH MG: 90 TABLET ORAL at 21:08

## 2017-04-29 RX ADMIN — METOPROLOL TARTRATE SCH MG: 25 TABLET, FILM COATED ORAL at 06:00

## 2017-04-29 RX ADMIN — SODIUM CHLORIDE, PRESERVATIVE FREE SCH ML: 5 INJECTION INTRAVENOUS at 09:53

## 2017-04-29 RX ADMIN — MORPHINE SULFATE PRN MG: 4 INJECTION INTRAVENOUS at 21:07

## 2017-04-29 RX ADMIN — AMPICILLIN SODIUM AND SULBACTAM SODIUM SCH MLS/HR: 2; 1 INJECTION, POWDER, FOR SOLUTION INTRAMUSCULAR; INTRAVENOUS at 02:00

## 2017-04-29 RX ADMIN — FUROSEMIDE SCH MG: 10 INJECTION, SOLUTION INTRAMUSCULAR; INTRAVENOUS at 12:23

## 2017-04-29 RX ADMIN — EPTIFIBATIDE PRN MLS/HR: 0.75 INJECTION, SOLUTION INTRAVENOUS at 06:54

## 2017-04-29 RX ADMIN — FAMOTIDINE SCH MG: 10 INJECTION INTRAVENOUS at 09:38

## 2017-04-29 RX ADMIN — AMPICILLIN SODIUM AND SULBACTAM SODIUM SCH MLS/HR: 2; 1 INJECTION, POWDER, FOR SOLUTION INTRAMUSCULAR; INTRAVENOUS at 11:28

## 2017-04-29 RX ADMIN — ALBUMIN (HUMAN) SCH MLS/HR: 25 SOLUTION INTRAVENOUS at 11:54

## 2017-04-29 RX ADMIN — ASPIRIN 81 MG SCH MG: 81 TABLET ORAL at 06:00

## 2017-04-29 RX ADMIN — CIPROFLOXACIN HYDROCHLORIDE SCH DROP: 3 SOLUTION/ DROPS OPHTHALMIC at 09:38

## 2017-04-29 RX ADMIN — INSULIN HUMAN SCH UNITS: 100 INJECTION, SOLUTION PARENTERAL at 07:00

## 2017-04-29 RX ADMIN — MORPHINE SULFATE PRN MG: 4 INJECTION INTRAVENOUS at 10:59

## 2017-04-29 RX ADMIN — POTASSIUM CHLORIDE SCH MEQ: 1.5 POWDER, FOR SOLUTION ORAL at 16:00

## 2017-04-29 RX ADMIN — CARVEDILOL SCH MG: 3.12 TABLET, FILM COATED ORAL at 21:08

## 2017-04-29 RX ADMIN — FAMOTIDINE SCH MG: 10 INJECTION INTRAVENOUS at 21:07

## 2017-04-29 RX ADMIN — POTASSIUM CHLORIDE SCH MEQ: 1.5 POWDER, FOR SOLUTION ORAL at 08:59

## 2017-04-29 RX ADMIN — DOCUSATE SODIUM 50MG AND SENNOSIDES 8.6MG SCH TAB: 8.6; 5 TABLET, FILM COATED ORAL at 08:59

## 2017-04-29 RX ADMIN — POTASSIUM CHLORIDE SCH MEQ: 1.5 POWDER, FOR SOLUTION ORAL at 20:48

## 2017-04-29 RX ADMIN — AMPICILLIN SODIUM AND SULBACTAM SODIUM SCH MLS/HR: 2; 1 INJECTION, POWDER, FOR SOLUTION INTRAMUSCULAR; INTRAVENOUS at 17:45

## 2017-04-29 RX ADMIN — CARVEDILOL SCH MG: 3.12 TABLET, FILM COATED ORAL at 14:03

## 2017-04-29 RX ADMIN — SODIUM CHLORIDE, PRESERVATIVE FREE SCH ML: 5 INJECTION INTRAVENOUS at 21:08

## 2017-04-29 RX ADMIN — INSULIN HUMAN SCH UNITS: 100 INJECTION, SOLUTION PARENTERAL at 17:45

## 2017-04-29 RX ADMIN — INSULIN HUMAN SCH UNITS: 100 INJECTION, SOLUTION PARENTERAL at 21:48

## 2017-04-29 RX ADMIN — POTASSIUM CHLORIDE SCH MEQ: 1.5 POWDER, FOR SOLUTION ORAL at 06:00

## 2017-04-29 RX ADMIN — INSULIN HUMAN SCH UNITS: 100 INJECTION, SOLUTION PARENTERAL at 11:56

## 2017-04-29 RX ADMIN — TICAGRELOR SCH MG: 90 TABLET ORAL at 12:46

## 2017-04-30 VITALS — DIASTOLIC BLOOD PRESSURE: 72 MMHG | SYSTOLIC BLOOD PRESSURE: 144 MMHG

## 2017-04-30 VITALS — DIASTOLIC BLOOD PRESSURE: 58 MMHG | SYSTOLIC BLOOD PRESSURE: 121 MMHG

## 2017-04-30 VITALS — SYSTOLIC BLOOD PRESSURE: 170 MMHG | DIASTOLIC BLOOD PRESSURE: 87 MMHG

## 2017-04-30 VITALS — SYSTOLIC BLOOD PRESSURE: 168 MMHG | DIASTOLIC BLOOD PRESSURE: 77 MMHG

## 2017-04-30 VITALS — SYSTOLIC BLOOD PRESSURE: 160 MMHG | DIASTOLIC BLOOD PRESSURE: 41 MMHG

## 2017-04-30 LAB
AT III ACT/NOR PPP CHRO: 68 % (ref 75–135)
BUN SERPL-MCNC: 26 MG/DL (ref 7–18)

## 2017-04-30 PROCEDURE — 3E0G76Z INTRODUCTION OF NUTRITIONAL SUBSTANCE INTO UPPER GI, VIA NATURAL OR ARTIFICIAL OPENING: ICD-10-PCS | Performed by: RADIOLOGY

## 2017-04-30 PROCEDURE — 0DH67UZ INSERTION OF FEEDING DEVICE INTO STOMACH, VIA NATURAL OR ARTIFICIAL OPENING: ICD-10-PCS | Performed by: RADIOLOGY

## 2017-04-30 RX ADMIN — AMPICILLIN SODIUM AND SULBACTAM SODIUM SCH MLS/HR: 2; 1 INJECTION, POWDER, FOR SOLUTION INTRAMUSCULAR; INTRAVENOUS at 00:01

## 2017-04-30 RX ADMIN — MORPHINE SULFATE PRN MG: 4 INJECTION INTRAVENOUS at 01:11

## 2017-04-30 RX ADMIN — SODIUM CHLORIDE, PRESERVATIVE FREE SCH ML: 5 INJECTION INTRAVENOUS at 09:14

## 2017-04-30 RX ADMIN — POTASSIUM CHLORIDE SCH MLS/HR: 2 INJECTION, SOLUTION, CONCENTRATE INTRAVENOUS at 23:09

## 2017-04-30 RX ADMIN — AMPICILLIN SODIUM AND SULBACTAM SODIUM SCH MLS/HR: 2; 1 INJECTION, POWDER, FOR SOLUTION INTRAMUSCULAR; INTRAVENOUS at 06:03

## 2017-04-30 RX ADMIN — FUROSEMIDE SCH MG: 10 INJECTION, SOLUTION INTRAMUSCULAR; INTRAVENOUS at 23:09

## 2017-04-30 RX ADMIN — DOCUSATE SODIUM 50MG AND SENNOSIDES 8.6MG SCH TAB: 8.6; 5 TABLET, FILM COATED ORAL at 09:00

## 2017-04-30 RX ADMIN — FUROSEMIDE SCH MG: 10 INJECTION, SOLUTION INTRAMUSCULAR; INTRAVENOUS at 02:08

## 2017-04-30 RX ADMIN — POTASSIUM CHLORIDE SCH MLS/HR: 2 INJECTION, SOLUTION, CONCENTRATE INTRAVENOUS at 00:56

## 2017-04-30 RX ADMIN — CIPROFLOXACIN HYDROCHLORIDE SCH DROP: 3 SOLUTION/ DROPS OPHTHALMIC at 09:14

## 2017-04-30 RX ADMIN — CIPROFLOXACIN HYDROCHLORIDE SCH DROP: 3 SOLUTION/ DROPS OPHTHALMIC at 23:09

## 2017-04-30 RX ADMIN — ASPIRIN 81 MG SCH MG: 81 TABLET ORAL at 09:14

## 2017-04-30 RX ADMIN — AMPICILLIN SODIUM AND SULBACTAM SODIUM SCH MLS/HR: 2; 1 INJECTION, POWDER, FOR SOLUTION INTRAMUSCULAR; INTRAVENOUS at 23:46

## 2017-04-30 RX ADMIN — FAMOTIDINE SCH MG: 10 INJECTION INTRAVENOUS at 23:09

## 2017-04-30 RX ADMIN — SODIUM CHLORIDE, PRESERVATIVE FREE SCH ML: 5 INJECTION INTRAVENOUS at 23:09

## 2017-04-30 RX ADMIN — INSULIN HUMAN SCH UNITS: 100 INJECTION, SOLUTION PARENTERAL at 18:11

## 2017-04-30 RX ADMIN — FUROSEMIDE SCH MG: 10 INJECTION, SOLUTION INTRAMUSCULAR; INTRAVENOUS at 09:13

## 2017-04-30 RX ADMIN — INSULIN HUMAN SCH UNITS: 100 INJECTION, SOLUTION PARENTERAL at 12:15

## 2017-04-30 RX ADMIN — TICAGRELOR SCH MG: 90 TABLET ORAL at 23:10

## 2017-04-30 RX ADMIN — ALBUMIN (HUMAN) SCH MLS/HR: 25 SOLUTION INTRAVENOUS at 12:14

## 2017-04-30 RX ADMIN — INSULIN HUMAN SCH UNITS: 100 INJECTION, SOLUTION PARENTERAL at 09:13

## 2017-04-30 RX ADMIN — SPIRONOLACTONE SCH MG: 25 TABLET ORAL at 09:14

## 2017-04-30 RX ADMIN — ALBUMIN (HUMAN) SCH MLS/HR: 25 SOLUTION INTRAVENOUS at 23:46

## 2017-04-30 RX ADMIN — TAMSULOSIN HYDROCHLORIDE SCH MG: 0.4 CAPSULE ORAL at 23:10

## 2017-04-30 RX ADMIN — POTASSIUM CHLORIDE SCH MEQ: 20 TABLET, EXTENDED RELEASE ORAL at 09:14

## 2017-04-30 RX ADMIN — FAMOTIDINE SCH MG: 10 INJECTION INTRAVENOUS at 09:13

## 2017-04-30 RX ADMIN — INSULIN HUMAN SCH UNITS: 100 INJECTION, SOLUTION PARENTERAL at 23:41

## 2017-04-30 RX ADMIN — TICAGRELOR SCH MG: 90 TABLET ORAL at 09:14

## 2017-04-30 RX ADMIN — DOCUSATE SODIUM 50MG AND SENNOSIDES 8.6MG SCH TAB: 8.6; 5 TABLET, FILM COATED ORAL at 09:14

## 2017-04-30 RX ADMIN — AMPICILLIN SODIUM AND SULBACTAM SODIUM SCH MLS/HR: 2; 1 INJECTION, POWDER, FOR SOLUTION INTRAMUSCULAR; INTRAVENOUS at 18:16

## 2017-04-30 RX ADMIN — ALBUMIN (HUMAN) SCH MLS/HR: 25 SOLUTION INTRAVENOUS at 00:56

## 2017-04-30 RX ADMIN — POTASSIUM CHLORIDE SCH MEQ: 20 TABLET, EXTENDED RELEASE ORAL at 23:34

## 2017-04-30 RX ADMIN — CARVEDILOL SCH MG: 3.12 TABLET, FILM COATED ORAL at 18:00

## 2017-04-30 RX ADMIN — POTASSIUM CHLORIDE SCH MEQ: 1.5 POWDER, FOR SOLUTION ORAL at 05:30

## 2017-04-30 RX ADMIN — AMPICILLIN SODIUM AND SULBACTAM SODIUM SCH MLS/HR: 2; 1 INJECTION, POWDER, FOR SOLUTION INTRAMUSCULAR; INTRAVENOUS at 13:15

## 2017-04-30 RX ADMIN — POTASSIUM CHLORIDE SCH MLS/HR: 2 INJECTION, SOLUTION, CONCENTRATE INTRAVENOUS at 09:14

## 2017-04-30 RX ADMIN — ATORVASTATIN CALCIUM SCH MG: 80 TABLET, FILM COATED ORAL at 23:10

## 2017-04-30 RX ADMIN — CARVEDILOL SCH MG: 3.12 TABLET, FILM COATED ORAL at 05:30

## 2017-05-01 VITALS — DIASTOLIC BLOOD PRESSURE: 74 MMHG | SYSTOLIC BLOOD PRESSURE: 151 MMHG

## 2017-05-01 VITALS — SYSTOLIC BLOOD PRESSURE: 143 MMHG | DIASTOLIC BLOOD PRESSURE: 65 MMHG

## 2017-05-01 VITALS — DIASTOLIC BLOOD PRESSURE: 79 MMHG | SYSTOLIC BLOOD PRESSURE: 160 MMHG

## 2017-05-01 VITALS — DIASTOLIC BLOOD PRESSURE: 62 MMHG | SYSTOLIC BLOOD PRESSURE: 131 MMHG

## 2017-05-01 LAB — BUN SERPL-MCNC: 20 MG/DL (ref 7–18)

## 2017-05-01 RX ADMIN — TICAGRELOR SCH MG: 90 TABLET ORAL at 22:28

## 2017-05-01 RX ADMIN — INSULIN HUMAN SCH UNITS: 100 INJECTION, SOLUTION PARENTERAL at 08:58

## 2017-05-01 RX ADMIN — AMPICILLIN SODIUM AND SULBACTAM SODIUM SCH MLS/HR: 2; 1 INJECTION, POWDER, FOR SOLUTION INTRAMUSCULAR; INTRAVENOUS at 13:28

## 2017-05-01 RX ADMIN — POTASSIUM CHLORIDE SCH MLS/HR: 2 INJECTION, SOLUTION, CONCENTRATE INTRAVENOUS at 08:54

## 2017-05-01 RX ADMIN — TAMSULOSIN HYDROCHLORIDE SCH MG: 0.4 CAPSULE ORAL at 08:56

## 2017-05-01 RX ADMIN — ATORVASTATIN CALCIUM SCH MG: 80 TABLET, FILM COATED ORAL at 22:28

## 2017-05-01 RX ADMIN — INSULIN HUMAN SCH UNITS: 100 INJECTION, SOLUTION PARENTERAL at 23:10

## 2017-05-01 RX ADMIN — CIPROFLOXACIN HYDROCHLORIDE SCH DROP: 3 SOLUTION/ DROPS OPHTHALMIC at 22:27

## 2017-05-01 RX ADMIN — SODIUM CHLORIDE, PRESERVATIVE FREE SCH ML: 5 INJECTION INTRAVENOUS at 21:00

## 2017-05-01 RX ADMIN — FUROSEMIDE SCH MG: 10 INJECTION, SOLUTION INTRAMUSCULAR; INTRAVENOUS at 08:53

## 2017-05-01 RX ADMIN — LISINOPRIL SCH MG: 5 TABLET ORAL at 08:53

## 2017-05-01 RX ADMIN — CIPROFLOXACIN HYDROCHLORIDE SCH DROP: 3 SOLUTION/ DROPS OPHTHALMIC at 09:08

## 2017-05-01 RX ADMIN — FUROSEMIDE SCH MG: 10 INJECTION, SOLUTION INTRAMUSCULAR; INTRAVENOUS at 22:28

## 2017-05-01 RX ADMIN — TICAGRELOR SCH MG: 90 TABLET ORAL at 08:53

## 2017-05-01 RX ADMIN — CARVEDILOL SCH MG: 6.25 TABLET, FILM COATED ORAL at 17:23

## 2017-05-01 RX ADMIN — ALBUMIN (HUMAN) SCH MLS/HR: 25 SOLUTION INTRAVENOUS at 13:33

## 2017-05-01 RX ADMIN — AMPICILLIN SODIUM AND SULBACTAM SODIUM SCH MLS/HR: 2; 1 INJECTION, POWDER, FOR SOLUTION INTRAMUSCULAR; INTRAVENOUS at 20:18

## 2017-05-01 RX ADMIN — SPIRONOLACTONE SCH MG: 25 TABLET ORAL at 08:53

## 2017-05-01 RX ADMIN — CARVEDILOL SCH MG: 3.12 TABLET, FILM COATED ORAL at 05:41

## 2017-05-01 RX ADMIN — POTASSIUM CHLORIDE SCH MLS/HR: 2 INJECTION, SOLUTION, CONCENTRATE INTRAVENOUS at 17:23

## 2017-05-01 RX ADMIN — ASPIRIN 81 MG SCH MG: 81 TABLET ORAL at 05:41

## 2017-05-01 RX ADMIN — FAMOTIDINE SCH MG: 10 INJECTION INTRAVENOUS at 22:28

## 2017-05-01 RX ADMIN — AMPICILLIN SODIUM AND SULBACTAM SODIUM SCH MLS/HR: 2; 1 INJECTION, POWDER, FOR SOLUTION INTRAMUSCULAR; INTRAVENOUS at 05:41

## 2017-05-01 RX ADMIN — POTASSIUM CHLORIDE SCH MEQ: 20 TABLET, EXTENDED RELEASE ORAL at 22:28

## 2017-05-01 RX ADMIN — FAMOTIDINE SCH MG: 10 INJECTION INTRAVENOUS at 08:53

## 2017-05-01 RX ADMIN — DOCUSATE SODIUM 50MG AND SENNOSIDES 8.6MG SCH TAB: 8.6; 5 TABLET, FILM COATED ORAL at 08:44

## 2017-05-01 RX ADMIN — POTASSIUM CHLORIDE SCH MEQ: 20 TABLET, EXTENDED RELEASE ORAL at 08:52

## 2017-05-01 RX ADMIN — INSULIN HUMAN SCH UNITS: 100 INJECTION, SOLUTION PARENTERAL at 16:00

## 2017-05-01 RX ADMIN — INSULIN HUMAN SCH UNITS: 100 INJECTION, SOLUTION PARENTERAL at 13:32

## 2017-05-01 RX ADMIN — SODIUM CHLORIDE, PRESERVATIVE FREE SCH ML: 5 INJECTION INTRAVENOUS at 09:08

## 2017-05-02 VITALS — SYSTOLIC BLOOD PRESSURE: 169 MMHG | DIASTOLIC BLOOD PRESSURE: 75 MMHG

## 2017-05-02 VITALS — DIASTOLIC BLOOD PRESSURE: 66 MMHG | SYSTOLIC BLOOD PRESSURE: 124 MMHG

## 2017-05-02 VITALS — SYSTOLIC BLOOD PRESSURE: 144 MMHG | DIASTOLIC BLOOD PRESSURE: 63 MMHG

## 2017-05-02 VITALS — DIASTOLIC BLOOD PRESSURE: 78 MMHG | SYSTOLIC BLOOD PRESSURE: 138 MMHG

## 2017-05-02 LAB
ABG COLLECTION SITE: (no result)
BUN SERPL-MCNC: 20 MG/DL (ref 7–18)
COLLATERAL CIRCULATION TESTING: NORMAL

## 2017-05-02 RX ADMIN — ALBUMIN (HUMAN) SCH MLS/HR: 25 SOLUTION INTRAVENOUS at 20:30

## 2017-05-02 RX ADMIN — ALBUMIN (HUMAN) SCH MLS/HR: 25 SOLUTION INTRAVENOUS at 12:52

## 2017-05-02 RX ADMIN — LISINOPRIL SCH MG: 5 TABLET ORAL at 09:33

## 2017-05-02 RX ADMIN — AMPICILLIN SODIUM AND SULBACTAM SODIUM SCH MLS/HR: 2; 1 INJECTION, POWDER, FOR SOLUTION INTRAMUSCULAR; INTRAVENOUS at 16:04

## 2017-05-02 RX ADMIN — ALBUMIN (HUMAN) SCH MLS/HR: 25 SOLUTION INTRAVENOUS at 01:22

## 2017-05-02 RX ADMIN — INSULIN HUMAN SCH UNITS: 100 INJECTION, SOLUTION PARENTERAL at 12:52

## 2017-05-02 RX ADMIN — ALBUTEROL SULFATE SCH MG: 2.5 SOLUTION RESPIRATORY (INHALATION) at 10:25

## 2017-05-02 RX ADMIN — FAMOTIDINE SCH MG: 10 INJECTION INTRAVENOUS at 20:34

## 2017-05-02 RX ADMIN — FAMOTIDINE SCH MG: 10 INJECTION INTRAVENOUS at 09:33

## 2017-05-02 RX ADMIN — INSULIN HUMAN SCH UNITS: 100 INJECTION, SOLUTION PARENTERAL at 09:32

## 2017-05-02 RX ADMIN — CIPROFLOXACIN HYDROCHLORIDE SCH DROP: 3 SOLUTION/ DROPS OPHTHALMIC at 09:33

## 2017-05-02 RX ADMIN — TICAGRELOR SCH MG: 90 TABLET ORAL at 09:32

## 2017-05-02 RX ADMIN — INSULIN HUMAN SCH UNITS: 100 INJECTION, SOLUTION PARENTERAL at 21:51

## 2017-05-02 RX ADMIN — CARVEDILOL SCH MG: 6.25 TABLET, FILM COATED ORAL at 18:15

## 2017-05-02 RX ADMIN — AMPICILLIN SODIUM AND SULBACTAM SODIUM SCH MLS/HR: 2; 1 INJECTION, POWDER, FOR SOLUTION INTRAMUSCULAR; INTRAVENOUS at 02:17

## 2017-05-02 RX ADMIN — AMPICILLIN SODIUM AND SULBACTAM SODIUM SCH MLS/HR: 2; 1 INJECTION, POWDER, FOR SOLUTION INTRAMUSCULAR; INTRAVENOUS at 20:19

## 2017-05-02 RX ADMIN — TICAGRELOR SCH MG: 90 TABLET ORAL at 20:34

## 2017-05-02 RX ADMIN — ALBUTEROL SULFATE SCH MG: 2.5 SOLUTION RESPIRATORY (INHALATION) at 15:00

## 2017-05-02 RX ADMIN — ATORVASTATIN CALCIUM SCH MG: 80 TABLET, FILM COATED ORAL at 20:34

## 2017-05-02 RX ADMIN — POTASSIUM CHLORIDE SCH MEQ: 20 TABLET, EXTENDED RELEASE ORAL at 20:34

## 2017-05-02 RX ADMIN — INSULIN HUMAN SCH UNITS: 100 INJECTION, SOLUTION PARENTERAL at 16:05

## 2017-05-02 RX ADMIN — POTASSIUM CHLORIDE SCH MEQ: 20 TABLET, EXTENDED RELEASE ORAL at 09:32

## 2017-05-02 RX ADMIN — AMPICILLIN SODIUM AND SULBACTAM SODIUM SCH MLS/HR: 2; 1 INJECTION, POWDER, FOR SOLUTION INTRAMUSCULAR; INTRAVENOUS at 09:33

## 2017-05-02 RX ADMIN — SODIUM CHLORIDE, PRESERVATIVE FREE SCH ML: 5 INJECTION INTRAVENOUS at 20:35

## 2017-05-02 RX ADMIN — FUROSEMIDE SCH MG: 10 INJECTION, SOLUTION INTRAMUSCULAR; INTRAVENOUS at 17:08

## 2017-05-02 RX ADMIN — SPIRONOLACTONE SCH MG: 25 TABLET ORAL at 09:32

## 2017-05-02 RX ADMIN — POTASSIUM CHLORIDE SCH MLS/HR: 2 INJECTION, SOLUTION, CONCENTRATE INTRAVENOUS at 06:30

## 2017-05-02 RX ADMIN — ALBUTEROL SULFATE SCH MG: 2.5 SOLUTION RESPIRATORY (INHALATION) at 19:10

## 2017-05-02 RX ADMIN — ASPIRIN 81 MG SCH MG: 81 TABLET ORAL at 06:29

## 2017-05-02 RX ADMIN — CIPROFLOXACIN HYDROCHLORIDE SCH DROP: 3 SOLUTION/ DROPS OPHTHALMIC at 20:34

## 2017-05-02 RX ADMIN — CARVEDILOL SCH MG: 6.25 TABLET, FILM COATED ORAL at 06:29

## 2017-05-02 RX ADMIN — ALBUTEROL SULFATE SCH MG: 2.5 SOLUTION RESPIRATORY (INHALATION) at 22:25

## 2017-05-02 RX ADMIN — OXYCODONE HYDROCHLORIDE PRN MG: 5 TABLET ORAL at 20:34

## 2017-05-02 RX ADMIN — TAMSULOSIN HYDROCHLORIDE SCH MG: 0.4 CAPSULE ORAL at 09:32

## 2017-05-02 RX ADMIN — SODIUM CHLORIDE, PRESERVATIVE FREE SCH ML: 5 INJECTION INTRAVENOUS at 09:32

## 2017-05-02 RX ADMIN — DOCUSATE SODIUM 50MG AND SENNOSIDES 8.6MG SCH TAB: 8.6; 5 TABLET, FILM COATED ORAL at 09:33

## 2017-05-02 RX ADMIN — FUROSEMIDE SCH MG: 10 INJECTION, SOLUTION INTRAMUSCULAR; INTRAVENOUS at 09:33

## 2017-05-03 VITALS — DIASTOLIC BLOOD PRESSURE: 65 MMHG | SYSTOLIC BLOOD PRESSURE: 119 MMHG

## 2017-05-03 VITALS — DIASTOLIC BLOOD PRESSURE: 57 MMHG | SYSTOLIC BLOOD PRESSURE: 114 MMHG

## 2017-05-03 VITALS — SYSTOLIC BLOOD PRESSURE: 145 MMHG | DIASTOLIC BLOOD PRESSURE: 65 MMHG

## 2017-05-03 VITALS — SYSTOLIC BLOOD PRESSURE: 135 MMHG | DIASTOLIC BLOOD PRESSURE: 71 MMHG

## 2017-05-03 VITALS — SYSTOLIC BLOOD PRESSURE: 131 MMHG | DIASTOLIC BLOOD PRESSURE: 63 MMHG

## 2017-05-03 LAB
ANISOCYTOSIS BLD QL SMEAR: (no result)
BUN SERPL-MCNC: 26 MG/DL (ref 7–18)
LG PLATELETS BLD QL SMEAR: (no result)
MACROCYTES BLD QL SMEAR: (no result)
MICROCYTES BLD QL SMEAR: (no result)
OVALOCYTES BLD QL SMEAR: (no result)
POLYCHROMASIA BLD QL SMEAR: (no result)
SPHEROCYTES BLD QL SMEAR: (no result)

## 2017-05-03 PROCEDURE — 0BBG3ZX EXCISION OF LEFT UPPER LUNG LOBE, PERCUTANEOUS APPROACH, DIAGNOSTIC: ICD-10-PCS | Performed by: RADIOLOGY

## 2017-05-03 RX ADMIN — INSULIN HUMAN SCH UNITS: 100 INJECTION, SOLUTION PARENTERAL at 20:54

## 2017-05-03 RX ADMIN — SODIUM CHLORIDE, PRESERVATIVE FREE SCH ML: 5 INJECTION INTRAVENOUS at 20:53

## 2017-05-03 RX ADMIN — TICAGRELOR SCH MG: 90 TABLET ORAL at 20:53

## 2017-05-03 RX ADMIN — TICAGRELOR SCH MG: 90 TABLET ORAL at 08:30

## 2017-05-03 RX ADMIN — ALBUMIN (HUMAN) SCH MLS/HR: 25 SOLUTION INTRAVENOUS at 13:44

## 2017-05-03 RX ADMIN — LISINOPRIL SCH MG: 5 TABLET ORAL at 08:30

## 2017-05-03 RX ADMIN — FUROSEMIDE SCH MG: 10 INJECTION, SOLUTION INTRAMUSCULAR; INTRAVENOUS at 08:29

## 2017-05-03 RX ADMIN — FAMOTIDINE SCH MG: 10 INJECTION INTRAVENOUS at 20:53

## 2017-05-03 RX ADMIN — SPIRONOLACTONE SCH MG: 25 TABLET ORAL at 08:30

## 2017-05-03 RX ADMIN — OXYCODONE HYDROCHLORIDE PRN MG: 5 TABLET ORAL at 20:53

## 2017-05-03 RX ADMIN — CARVEDILOL SCH MG: 6.25 TABLET, FILM COATED ORAL at 17:58

## 2017-05-03 RX ADMIN — OXYCODONE HYDROCHLORIDE PRN MG: 5 TABLET ORAL at 00:55

## 2017-05-03 RX ADMIN — AMPICILLIN SODIUM AND SULBACTAM SODIUM SCH MLS/HR: 2; 1 INJECTION, POWDER, FOR SOLUTION INTRAMUSCULAR; INTRAVENOUS at 00:56

## 2017-05-03 RX ADMIN — ALBUTEROL SULFATE SCH MG: 2.5 SOLUTION RESPIRATORY (INHALATION) at 14:30

## 2017-05-03 RX ADMIN — ALBUTEROL SULFATE SCH MG: 2.5 SOLUTION RESPIRATORY (INHALATION) at 10:55

## 2017-05-03 RX ADMIN — INSULIN HUMAN SCH UNITS: 100 INJECTION, SOLUTION PARENTERAL at 03:00

## 2017-05-03 RX ADMIN — ATORVASTATIN CALCIUM SCH MG: 80 TABLET, FILM COATED ORAL at 20:53

## 2017-05-03 RX ADMIN — ASPIRIN 81 MG SCH MG: 81 TABLET ORAL at 08:30

## 2017-05-03 RX ADMIN — ALBUTEROL SULFATE SCH MG: 2.5 SOLUTION RESPIRATORY (INHALATION) at 07:00

## 2017-05-03 RX ADMIN — INSULIN HUMAN SCH UNITS: 100 INJECTION, SOLUTION PARENTERAL at 15:00

## 2017-05-03 RX ADMIN — AMPICILLIN SODIUM AND SULBACTAM SODIUM SCH MLS/HR: 2; 1 INJECTION, POWDER, FOR SOLUTION INTRAMUSCULAR; INTRAVENOUS at 20:36

## 2017-05-03 RX ADMIN — TAMSULOSIN HYDROCHLORIDE SCH MG: 0.4 CAPSULE ORAL at 08:30

## 2017-05-03 RX ADMIN — ALBUTEROL SULFATE SCH MG: 2.5 SOLUTION RESPIRATORY (INHALATION) at 18:45

## 2017-05-03 RX ADMIN — CIPROFLOXACIN HYDROCHLORIDE SCH DROP: 3 SOLUTION/ DROPS OPHTHALMIC at 08:49

## 2017-05-03 RX ADMIN — FAMOTIDINE SCH MG: 10 INJECTION INTRAVENOUS at 08:30

## 2017-05-03 RX ADMIN — ALBUTEROL SULFATE SCH MG: 2.5 SOLUTION RESPIRATORY (INHALATION) at 22:00

## 2017-05-03 RX ADMIN — AMPICILLIN SODIUM AND SULBACTAM SODIUM SCH MLS/HR: 2; 1 INJECTION, POWDER, FOR SOLUTION INTRAMUSCULAR; INTRAVENOUS at 08:28

## 2017-05-03 RX ADMIN — CARVEDILOL SCH MG: 6.25 TABLET, FILM COATED ORAL at 06:00

## 2017-05-03 RX ADMIN — INSULIN HUMAN SCH UNITS: 100 INJECTION, SOLUTION PARENTERAL at 08:29

## 2017-05-03 RX ADMIN — CIPROFLOXACIN HYDROCHLORIDE SCH DROP: 3 SOLUTION/ DROPS OPHTHALMIC at 20:53

## 2017-05-03 RX ADMIN — POTASSIUM CHLORIDE SCH MEQ: 20 TABLET, EXTENDED RELEASE ORAL at 08:30

## 2017-05-03 RX ADMIN — OXYCODONE HYDROCHLORIDE PRN MG: 5 TABLET ORAL at 16:30

## 2017-05-03 RX ADMIN — AMPICILLIN SODIUM AND SULBACTAM SODIUM SCH MLS/HR: 2; 1 INJECTION, POWDER, FOR SOLUTION INTRAMUSCULAR; INTRAVENOUS at 13:44

## 2017-05-03 RX ADMIN — POTASSIUM CHLORIDE SCH MEQ: 20 TABLET, EXTENDED RELEASE ORAL at 20:53

## 2017-05-03 RX ADMIN — SODIUM CHLORIDE, PRESERVATIVE FREE SCH ML: 5 INJECTION INTRAVENOUS at 08:32

## 2017-05-03 RX ADMIN — DOCUSATE SODIUM 50MG AND SENNOSIDES 8.6MG SCH TAB: 8.6; 5 TABLET, FILM COATED ORAL at 08:30

## 2017-05-04 VITALS — SYSTOLIC BLOOD PRESSURE: 117 MMHG | DIASTOLIC BLOOD PRESSURE: 56 MMHG

## 2017-05-04 VITALS — DIASTOLIC BLOOD PRESSURE: 47 MMHG | SYSTOLIC BLOOD PRESSURE: 110 MMHG

## 2017-05-04 VITALS — DIASTOLIC BLOOD PRESSURE: 57 MMHG | SYSTOLIC BLOOD PRESSURE: 120 MMHG

## 2017-05-04 LAB — BUN SERPL-MCNC: 37 MG/DL (ref 7–18)

## 2017-05-04 RX ADMIN — ASPIRIN 81 MG SCH MG: 81 TABLET ORAL at 05:22

## 2017-05-04 RX ADMIN — OXYCODONE HYDROCHLORIDE PRN MG: 5 TABLET ORAL at 02:19

## 2017-05-04 RX ADMIN — AMPICILLIN SODIUM AND SULBACTAM SODIUM SCH MLS/HR: 2; 1 INJECTION, POWDER, FOR SOLUTION INTRAMUSCULAR; INTRAVENOUS at 10:20

## 2017-05-04 RX ADMIN — INSULIN HUMAN SCH UNITS: 100 INJECTION, SOLUTION PARENTERAL at 08:45

## 2017-05-04 RX ADMIN — ALBUMIN (HUMAN) SCH MLS/HR: 25 SOLUTION INTRAVENOUS at 11:24

## 2017-05-04 RX ADMIN — TAMSULOSIN HYDROCHLORIDE SCH MG: 0.4 CAPSULE ORAL at 08:31

## 2017-05-04 RX ADMIN — FAMOTIDINE SCH MG: 10 INJECTION INTRAVENOUS at 08:44

## 2017-05-04 RX ADMIN — INSULIN HUMAN SCH UNITS: 100 INJECTION, SOLUTION PARENTERAL at 02:34

## 2017-05-04 RX ADMIN — SODIUM CHLORIDE, PRESERVATIVE FREE SCH ML: 5 INJECTION INTRAVENOUS at 08:44

## 2017-05-04 RX ADMIN — FUROSEMIDE SCH MG: 10 INJECTION, SOLUTION INTRAMUSCULAR; INTRAVENOUS at 13:02

## 2017-05-04 RX ADMIN — ALBUTEROL SULFATE SCH MG: 2.5 SOLUTION RESPIRATORY (INHALATION) at 10:52

## 2017-05-04 RX ADMIN — DOCUSATE SODIUM 50MG AND SENNOSIDES 8.6MG SCH TAB: 8.6; 5 TABLET, FILM COATED ORAL at 08:31

## 2017-05-04 RX ADMIN — ALBUTEROL SULFATE SCH MG: 2.5 SOLUTION RESPIRATORY (INHALATION) at 07:14

## 2017-05-04 RX ADMIN — LISINOPRIL SCH MG: 5 TABLET ORAL at 08:44

## 2017-05-04 RX ADMIN — POTASSIUM CHLORIDE SCH MEQ: 20 TABLET, EXTENDED RELEASE ORAL at 08:44

## 2017-05-04 RX ADMIN — ALBUMIN (HUMAN) SCH MLS/HR: 25 SOLUTION INTRAVENOUS at 01:02

## 2017-05-04 RX ADMIN — FUROSEMIDE SCH MG: 10 INJECTION, SOLUTION INTRAMUSCULAR; INTRAVENOUS at 02:19

## 2017-05-04 RX ADMIN — SPIRONOLACTONE SCH MG: 25 TABLET ORAL at 08:44

## 2017-05-04 RX ADMIN — CIPROFLOXACIN HYDROCHLORIDE SCH DROP: 3 SOLUTION/ DROPS OPHTHALMIC at 08:44

## 2017-05-04 RX ADMIN — AMPICILLIN SODIUM AND SULBACTAM SODIUM SCH MLS/HR: 2; 1 INJECTION, POWDER, FOR SOLUTION INTRAMUSCULAR; INTRAVENOUS at 02:19

## 2017-05-04 RX ADMIN — CARVEDILOL SCH MG: 6.25 TABLET, FILM COATED ORAL at 05:22

## 2017-05-04 RX ADMIN — TICAGRELOR SCH MG: 90 TABLET ORAL at 08:44

## 2021-01-13 DIAGNOSIS — Z23 NEED FOR VACCINATION: ICD-10-CM
